# Patient Record
Sex: FEMALE | Race: WHITE | NOT HISPANIC OR LATINO | URBAN - METROPOLITAN AREA
[De-identification: names, ages, dates, MRNs, and addresses within clinical notes are randomized per-mention and may not be internally consistent; named-entity substitution may affect disease eponyms.]

---

## 2017-08-21 ENCOUNTER — OUTPATIENT (OUTPATIENT)
Dept: OUTPATIENT SERVICES | Facility: HOSPITAL | Age: 72
LOS: 1 days | Discharge: HOME | End: 2017-08-21

## 2017-08-21 DIAGNOSIS — I10 ESSENTIAL (PRIMARY) HYPERTENSION: ICD-10-CM

## 2017-08-21 DIAGNOSIS — J38.5 LARYNGEAL SPASM: ICD-10-CM

## 2017-08-21 DIAGNOSIS — R42 DIZZINESS AND GIDDINESS: ICD-10-CM

## 2017-09-11 ENCOUNTER — OUTPATIENT (OUTPATIENT)
Dept: OUTPATIENT SERVICES | Facility: HOSPITAL | Age: 72
LOS: 1 days | Discharge: HOME | End: 2017-09-11

## 2017-09-11 DIAGNOSIS — I10 ESSENTIAL (PRIMARY) HYPERTENSION: ICD-10-CM

## 2017-09-11 DIAGNOSIS — Z12.31 ENCOUNTER FOR SCREENING MAMMOGRAM FOR MALIGNANT NEOPLASM OF BREAST: ICD-10-CM

## 2017-09-11 DIAGNOSIS — R42 DIZZINESS AND GIDDINESS: ICD-10-CM

## 2017-09-13 DIAGNOSIS — M89.9 DISORDER OF BONE, UNSPECIFIED: ICD-10-CM

## 2017-09-13 DIAGNOSIS — Z78.0 ASYMPTOMATIC MENOPAUSAL STATE: ICD-10-CM

## 2017-09-13 DIAGNOSIS — Z13.820 ENCOUNTER FOR SCREENING FOR OSTEOPOROSIS: ICD-10-CM

## 2017-09-14 ENCOUNTER — OUTPATIENT (OUTPATIENT)
Dept: OUTPATIENT SERVICES | Facility: HOSPITAL | Age: 72
LOS: 1 days | Discharge: HOME | End: 2017-09-14

## 2017-09-14 DIAGNOSIS — R42 DIZZINESS AND GIDDINESS: ICD-10-CM

## 2017-09-14 DIAGNOSIS — R92.8 OTHER ABNORMAL AND INCONCLUSIVE FINDINGS ON DIAGNOSTIC IMAGING OF BREAST: ICD-10-CM

## 2017-09-14 DIAGNOSIS — I10 ESSENTIAL (PRIMARY) HYPERTENSION: ICD-10-CM

## 2017-12-13 ENCOUNTER — INPATIENT (INPATIENT)
Facility: HOSPITAL | Age: 72
LOS: 1 days | Discharge: HOME HEALTH | End: 2017-12-15
Attending: INTERNAL MEDICINE | Admitting: INTERNAL MEDICINE

## 2017-12-13 DIAGNOSIS — I10 ESSENTIAL (PRIMARY) HYPERTENSION: ICD-10-CM

## 2017-12-13 DIAGNOSIS — R42 DIZZINESS AND GIDDINESS: ICD-10-CM

## 2017-12-19 DIAGNOSIS — R42 DIZZINESS AND GIDDINESS: ICD-10-CM

## 2017-12-19 DIAGNOSIS — I10 ESSENTIAL (PRIMARY) HYPERTENSION: ICD-10-CM

## 2017-12-19 DIAGNOSIS — M19.90 UNSPECIFIED OSTEOARTHRITIS, UNSPECIFIED SITE: ICD-10-CM

## 2017-12-19 DIAGNOSIS — R11.0 NAUSEA: ICD-10-CM

## 2017-12-19 DIAGNOSIS — E83.42 HYPOMAGNESEMIA: ICD-10-CM

## 2017-12-19 DIAGNOSIS — F32.9 MAJOR DEPRESSIVE DISORDER, SINGLE EPISODE, UNSPECIFIED: ICD-10-CM

## 2017-12-19 DIAGNOSIS — R94.31 ABNORMAL ELECTROCARDIOGRAM [ECG] [EKG]: ICD-10-CM

## 2017-12-19 DIAGNOSIS — E87.1 HYPO-OSMOLALITY AND HYPONATREMIA: ICD-10-CM

## 2017-12-19 DIAGNOSIS — R27.0 ATAXIA, UNSPECIFIED: ICD-10-CM

## 2017-12-19 DIAGNOSIS — I42.1 OBSTRUCTIVE HYPERTROPHIC CARDIOMYOPATHY: ICD-10-CM

## 2018-03-16 ENCOUNTER — OUTPATIENT (OUTPATIENT)
Dept: OUTPATIENT SERVICES | Facility: HOSPITAL | Age: 73
LOS: 1 days | Discharge: HOME | End: 2018-03-16

## 2018-03-16 DIAGNOSIS — R92.8 OTHER ABNORMAL AND INCONCLUSIVE FINDINGS ON DIAGNOSTIC IMAGING OF BREAST: ICD-10-CM

## 2018-11-06 ENCOUNTER — OUTPATIENT (OUTPATIENT)
Dept: OUTPATIENT SERVICES | Facility: HOSPITAL | Age: 73
LOS: 1 days | Discharge: HOME | End: 2018-11-06

## 2018-11-06 DIAGNOSIS — R92.8 OTHER ABNORMAL AND INCONCLUSIVE FINDINGS ON DIAGNOSTIC IMAGING OF BREAST: ICD-10-CM

## 2019-04-22 PROBLEM — Z00.00 ENCOUNTER FOR PREVENTIVE HEALTH EXAMINATION: Status: ACTIVE | Noted: 2019-04-22

## 2019-05-22 ENCOUNTER — APPOINTMENT (OUTPATIENT)
Dept: CARDIOLOGY | Facility: CLINIC | Age: 74
End: 2019-05-22
Payer: MEDICARE

## 2019-05-22 PROCEDURE — 93000 ELECTROCARDIOGRAM COMPLETE: CPT

## 2019-05-22 PROCEDURE — 99214 OFFICE O/P EST MOD 30 MIN: CPT

## 2019-08-20 ENCOUNTER — OUTPATIENT (OUTPATIENT)
Dept: OUTPATIENT SERVICES | Facility: HOSPITAL | Age: 74
LOS: 1 days | Discharge: HOME | End: 2019-08-20
Payer: MEDICARE

## 2019-08-20 DIAGNOSIS — R92.8 OTHER ABNORMAL AND INCONCLUSIVE FINDINGS ON DIAGNOSTIC IMAGING OF BREAST: ICD-10-CM

## 2019-08-20 PROCEDURE — 76642 ULTRASOUND BREAST LIMITED: CPT | Mod: 26,RT

## 2019-08-20 PROCEDURE — 77065 DX MAMMO INCL CAD UNI: CPT | Mod: 26,RT

## 2019-08-20 PROCEDURE — G0279: CPT | Mod: 26,RT

## 2019-08-23 ENCOUNTER — RESULT REVIEW (OUTPATIENT)
Age: 74
End: 2019-08-23

## 2019-08-23 ENCOUNTER — OUTPATIENT (OUTPATIENT)
Dept: OUTPATIENT SERVICES | Facility: HOSPITAL | Age: 74
LOS: 1 days | Discharge: HOME | End: 2019-08-23
Payer: MEDICARE

## 2019-08-23 PROCEDURE — 19083 BX BREAST 1ST LESION US IMAG: CPT | Mod: RT

## 2019-08-23 PROCEDURE — 88305 TISSUE EXAM BY PATHOLOGIST: CPT | Mod: 26

## 2019-08-23 PROCEDURE — 88342 IMHCHEM/IMCYTCHM 1ST ANTB: CPT | Mod: 26

## 2019-08-23 PROCEDURE — 88341 IMHCHEM/IMCYTCHM EA ADD ANTB: CPT | Mod: 26

## 2019-08-23 PROCEDURE — 77065 DX MAMMO INCL CAD UNI: CPT | Mod: 26,RT

## 2019-08-26 LAB — SURGICAL PATHOLOGY STUDY: SIGNIFICANT CHANGE UP

## 2019-08-28 DIAGNOSIS — Z17.0 ESTROGEN RECEPTOR POSITIVE STATUS [ER+]: ICD-10-CM

## 2019-08-28 DIAGNOSIS — C50.911 MALIGNANT NEOPLASM OF UNSPECIFIED SITE OF RIGHT FEMALE BREAST: ICD-10-CM

## 2019-08-28 DIAGNOSIS — N63.10 UNSPECIFIED LUMP IN THE RIGHT BREAST, UNSPECIFIED QUADRANT: ICD-10-CM

## 2019-09-04 ENCOUNTER — APPOINTMENT (OUTPATIENT)
Dept: BREAST CENTER | Facility: CLINIC | Age: 74
End: 2019-09-04
Payer: MEDICARE

## 2019-09-04 VITALS
WEIGHT: 120 LBS | HEIGHT: 62 IN | SYSTOLIC BLOOD PRESSURE: 146 MMHG | BODY MASS INDEX: 22.08 KG/M2 | DIASTOLIC BLOOD PRESSURE: 88 MMHG | TEMPERATURE: 98.4 F

## 2019-09-04 DIAGNOSIS — Z86.79 PERSONAL HISTORY OF OTHER DISEASES OF THE CIRCULATORY SYSTEM: ICD-10-CM

## 2019-09-04 DIAGNOSIS — Z86.39 PERSONAL HISTORY OF OTHER ENDOCRINE, NUTRITIONAL AND METABOLIC DISEASE: ICD-10-CM

## 2019-09-04 PROCEDURE — 99204 OFFICE O/P NEW MOD 45 MIN: CPT

## 2019-09-05 PROBLEM — Z86.79 HISTORY OF HYPERTENSION: Status: RESOLVED | Noted: 2019-09-05 | Resolved: 2019-09-05

## 2019-09-05 PROBLEM — Z86.39 HISTORY OF HYPERLIPIDEMIA: Status: RESOLVED | Noted: 2019-09-05 | Resolved: 2019-09-05

## 2019-09-05 PROBLEM — Z86.79 HISTORY OF HYPERTROPHIC CARDIOMYOPATHY: Status: RESOLVED | Noted: 2019-09-05 | Resolved: 2019-09-05

## 2019-09-05 RX ORDER — METOPROLOL TARTRATE 50 MG/1
50 TABLET, FILM COATED ORAL
Refills: 0 | Status: ACTIVE | COMMUNITY

## 2019-09-05 RX ORDER — LOSARTAN POTASSIUM 100 MG/1
TABLET, FILM COATED ORAL
Refills: 0 | Status: ACTIVE | COMMUNITY

## 2019-09-05 RX ORDER — EZETIMIBE 10 MG/1
TABLET ORAL
Refills: 0 | Status: ACTIVE | COMMUNITY

## 2019-09-05 NOTE — ASSESSMENT
[FreeTextEntry1] : Clary is a 74 postmenopausal F with a screen detected right breast cancer, [id number], ER/VA pos, Her 2 neg, stage 1, AJCC 8th edition. \par \par There was no suspicious abnormalities palpated in either breast on exam today.  Her recently biopsied right breast asymmetry was not readily palpable.  She did not have any lymphadenopathy on exam today.  I would like to repeat b/l axillary US to rule out any abnormal appearing lymph nodes. \par \par We have discussed her new diagnosis of breast cancer.  She is currently a stage 1 breast cancer, based off AJCC 8th edition.  We discussed her surgical and other treatment options at this time. \par \par In regards to her right sided breast cancer, she is a great candidate for either breast conservation surgery or a mastectomy.  This lesion is located about 5-6 cm from the nipple based off the US so a nipple sparing mastectomy could be attempted, as long as the intraop frozen section shows that the retroareolar margin is negative for invasive disease.  There is no difference in survival between a lumpectomy + radiation therapy and a mastectomy.  However the rate of local recurrence is slightly higher with the lumpectomy. The rate of recurrence with a mastectomy is not zero, however, and is likely closer to 4-9%.  \par \par Regardless of if she chooses a mastectomy or a lumpectomy, we would normally perform an evaluation of her axillary lymph nodes via a sentinel lymph node biopsy.  Per [facility] choosing wisely guidelines, for patients >69 y/o with small breast cancers that are ER/VA positive and Her 2 negative, I have given her the option for omitting the SLN Bx as it would likely not change her further management, would add morbidity to her surgery and would likely not change her overall survival.  Especially given her history of hypertrophic cardiomyopathy, she also will not tolerate general anesthesia well.  This was discussed with Clary and two of her children who agree with the omission of the SLN Bx. \par \par She would like to pursue breast conservation surgery.  Her right breast lesion is not palpable on clinical exam, so she will need a preoperative RF localization procedure of her right breast mass.  In the interim, we will obtain a bilateral breast MRI to further evaluate the extent of her disease. \par \par We did briefly discuss the different radiation options.  If she got a mastectomy, she would likely only need radiation therapy if she had a lot of positive margins or if her axillary lymph nodes were positive for metastatic disease.  If she undergoes a lumpectomy, she is a candidate for both partial breast irradiation and whole breast irradiation.  We briefly discussed the differences between these two modalities.\par \par In regards to systemic therapy, we briefly discussed both chemotherapy and endocrine therapy.  If the final pathology reveals that the tumor is larger than 1 cm and her 2 negative, we would likely need to send an additional study on her tumor sample, called an oncotype DX to make the determination regarding if chemotherapy would help her.  At the completion of all these treatments, she should get endocrine therapy, an AI, for a total of at least 5 years.  \par \par In regards to reconstruction, if she decides to proceed with a mastectomy,  she has the option for undergoing immediate breast reconstruction. She is not interested in undergoing a mastectomy at this time. \par \par She will need a breast MRI to determine extent of disease.  This will be scheduled for her. \par \par In addition, per [facility] consensus guideline, any patient with breast cancer qualifies for genetic testing.  [facility] panel genetic testing was performed today. \par \par All of her questions were answered.  She knows to call with any further questions or concerns. I will see her after she gets her MRI breast. \par \par PLAN\par -breast MRI \par -[facility] genetic testing \par -OR: RIGHT WIDE LOCAL EXCISION WITH RF LOCALIZATION \par -DIAGNOSIS: RIGHT BREAST CANCER \par \par PLAN: \par -breast MRI, B/L AXILLARY us \par -genetic testing -- [facility], blood drawn today \par -OR: R WLE WITH RF LOCALIZATION (WILL OMIT THE SLN BX)\par -DIAGNOSIS: RIGHT BREAST CANCER

## 2019-09-05 NOTE — HISTORY OF PRESENT ILLNESS
[FreeTextEntry1] : Clary is a 74 F with a screen detected right breast cancer, [id number], ER/LA pos, her 2 neg, AJCC 8th edition. \par \par She has no breast related complaints at this time.  She denies any breast pain, has not palpated any new palpable masses in either breast and denies any nipple discharge or retraction.  \par \par Her work up was as follows: \par 19 -- R dx mammogram and US (short term 6 month f/up for BIRADS 3 calcifications)\par -scattered areas of fibroglandular density\par -R: calcifications show no change in number, distribution, or morphology --> deemed BIRADS 3\par -developing asymmetry in medial R breast \par R US \par -@1N5-6, vague hypoechoic areas measuring 4 x 3 x 4 mm --> rec US CNBx \par BIRADS 4\par \par 19 -- R US CNBx \par -IDC, well differentiated, tubular carcinoma\par -ER/LA pos, her 2 negative \par -Ki67 3-5%\par \par Of note, she has been on estrogen for the past 20 years and has not been able to wean herself off of it yet. \par \par HISTORICAL RISK FACTORS: \par -history of breast aspiration in the past, but no prior breast biopsies or surgeries \par -no family history of breast or ovarian cancer \par -, age at first live birth was 22 \par -HRT use for 20 years \par -no prior OCP use

## 2019-09-05 NOTE — DATA REVIEWED
[FreeTextEntry1] : EXAM: US BREAST LIMITED RT \par EXAM: MG MAMMO DIAG W DORETHA RT# \par \par \par PROCEDURE DATE: 08/20/2019 \par \par \par \par INTERPRETATION: Clinical History / Reason for exam: Follow-up for right \par breast calcifications since September 2017. \par \par The patient reports her last clinical breast examination was performed over \par one year prior. \par \par This is a follow-up examination to reassess calcifications considered \par probably benign on the previous mammogram. Magnification images as well as \par routine mammographic projections were obtained. 3D tomosynthesis images were \par obtained as well. \par \par Computer-aided detection was utilized in the interpretation of this \par examination. \par \par Comparison is made to several prior exams dating back to 10/14/2014 \par \par \par Breast composition:There are scattered areas of fibroglandular density. \par \par The calcifications, situated in the right breast, show no change in number, \par distribution or morphology. Additional follow-up in 6 months is recommended \par \par Incidental note is made of a developing focal asymmetry in the medial aspect \par of the right breast. Therefore, targeted ultrasound was performed. \par \par Targeted right breast ultrasound \par \par At 1:00 5 to 6 cm from the nipple, there is a vague hypoechoic area \par measuring 4 x 3 x 4 mm. This may correspond to the mammographic finding. \par \par Impression: The stability of the calcifications in the right breast supports \par a benign etiology. Follow-up mammogram in 6 months is recommended \par \par Developing focal asymmetry in the medial aspect of the right breast is \par suspicious. \par \par Recommendation: Ultrasound-guided biopsy of the right breast. \par \par BI-RADS Category 4: Suspicious \par \par \par \par The findings and recommendations were discussed with the patient and all \par questions were answered. \par \par The findings and recommendations were discussed with Dr. Celis on 8/20/2019 \par at 1:30 PM. \par \par \par \par \par \par \par \par CAROLINE BUSTOS M.D., ATTENDING RADIOLOGIST \par This document has been electronically signed. Aug 20 2019 1:30PM \par \par EXAM: US BX BRST 1ST RT [facility] \par \par *** ADDENDUM 09/03/2019 *** \par \par Histology: \par \par - INVASIVE WELL DIFFERENTIATED DUCTAL CARCINOMA (TUBULAR \par CARCINOMA). \par \par Findings are malignant concordant. \par \par Recommendations: Surgical and oncologic follow up is recommended. \par \par The above findings and recommendations were discussed with the patient on \par 8/29/2019. \par \par The above findings and recommendations were discussed with Dr. Celis on \par 8/29/2019. \par \par \par *** END OF ADDENDUM 09/03/2019 *** \par \par \par \par PROCEDURE DATE: 08/23/2019 \par \par \par \par INTERPRETATION: Clinical History / Reason for exam: Indeterminate right \par breast mass. \par \par Images were reviewed. Informed consent was obtained including a discussion \par of risks and benefits of the procedure as well as questioning about patient \par allergies. The patient safety checklist, including time out procedure, was \par used. \par \par The mass in the 1 o clock position 5 to 6 cm from the nipple was identified. \par Under sterile conditions and after local infiltration with 5 mL buffered 1% \par lidocaine a 14g spring loaded core biopsy needle was inserted. Under \par ultrasound guidance the needle was positioned adjacent to the mass and 5 \par core samples were obtained. The needle was removed. A marking clip (CoSchedule \par top-hat) was deployed under ultrasound guidance. Manual compression was \par applied to achieve hemostasis. \par \par The patient tolerated the procedure well and there was no immediate post \par procedure complication. The specimens were sent to the laboratory for \par analysis. \par \par A post procedure mammogram was obtained and demonstrated the clip to be in \par good position. \par \par IMPRESSION: \par \par Successful ultrasound guided core biopsy of the right breast. \par \par Histology: Pending, to be reported in an addendum. \par \par ***Please see the addendum at the top of this report. It may contain \par additional important information or changes.**** \par \par \par \par \par HORTENSIA BURNS M.D., ATTENDING RADIOLOGIST \par This document has been electronically signed. Aug 23 2019 1:53PM \par Addend: HORTENSIA BURNS M.D., ATTENDING RADIOLOGIST \par This addendum was electronically signed on: Sep 3 2019 2:24PM. \par \par EXAM: MG MAMMO DIAG BI# \par \par \par PROCEDURE DATE: 11/06/2018 \par \par \par \par INTERPRETATION: Clinical History / Reason for exam: Follow-up for right \par breast calcifications since September 2017. \par \par The patient reports her last clinical breast examination was performed 7 \par months ago. \par \par This is a follow-up examination to reassess calcifications considered \par probably benign on the previous mammogram. Magnification images as well as \par routine mammographic projections were obtained. \par \par Computer-aided detection was utilized in the interpretation of this \par examination. \par \par Comparison is made to the prior mammograms dated March 16, 2018, September \par 14, 2017 and September 11, 2017. \par \par Breast composition:There are scattered areas of fibroglandular density. \par \par No new or suspicious masses or areas of architectural distortion are seen in \par either breast. The calcifications, situated in the right breast, show no \par change in number, distribution or morphology. \par \par Impression: The stability of the calcifications in the right breast supports \par a benign etiology. \par \par Recommendation: Follow-up unilateral diagnostic mammogram in 6 months. \par \par BI-RADS category 3: Probably Benign \par \par \par \par \par \par \par \par HARJINDER MEJIA M.D., ATTENDING RADIOLOGIST \par This document has been electronically signed. Nov 6 2018 3:30PM \par \par

## 2019-09-05 NOTE — REVIEW OF SYSTEMS
[Negative] : Heme/Lymph [Fever] : no fever [Chills] : no chills [Abn Vaginal Bleeding] : no unexplained vaginal bleeding [As Noted in HPI] : as noted in HPI [Skin Lesions] : no skin lesions [Skin Wound] : no skin wound [Breast Pain] : no breast pain [Breast Lump] : no breast lump [Hot Flashes] : no hot flashes [FreeTextEntry5] : has a history of cardiomyopathy, able to walk but does get winded walking up hills or walking for a long period of time

## 2019-09-05 NOTE — PAST MEDICAL HISTORY
[Menopause Age____] : age at menopause was [unfilled] [Menarche Age ____] : age at menarche was [unfilled] [History of Hormone Replacement Treatment] : has a history of hormone replacement treatment [Total Preg ___] : G[unfilled] [Live Births ___] : P[unfilled]  [Age At Live Birth ___] : Age at live birth: [unfilled] [FreeTextEntry5] : denies  [FreeTextEntry6] : denies [FreeTextEntry7] : denies  [FreeTextEntry8] : denies

## 2019-09-05 NOTE — PHYSICAL EXAM
[Normocephalic] : normocephalic [Atraumatic] : atraumatic [EOMI] : extra ocular movement intact [No Supraclavicular Adenopathy] : no supraclavicular adenopathy [Clear to Auscultation Bilat] : clear to auscultation bilaterally [No Cervical Adenopathy] : no cervical adenopathy [No dominant masses] : no dominant masses in right breast  [No dominant masses] : no dominant masses left breast [No Nipple Retraction] : no right nipple retraction [No Nipple Discharge] : no left nipple discharge [Not Tender] : non-tender [Soft] : abdomen soft [No Edema] : no edema [No Rashes] : no rashes [No Ulceration] : no ulceration [Examined in the supine and seated position] : examined in the supine and seated position [No Axillary Lymphadenopathy] : no right axillary lymphadenopathy [de-identified] : no suspicious mass palpated in either breast  [de-identified] : no suspicious masses palpated in area of her recent biopsy  [de-identified] : no suspicious lymphadenopathy

## 2019-09-06 ENCOUNTER — APPOINTMENT (OUTPATIENT)
Dept: CARDIOLOGY | Facility: CLINIC | Age: 74
End: 2019-09-06
Payer: MEDICARE

## 2019-09-06 PROCEDURE — 93000 ELECTROCARDIOGRAM COMPLETE: CPT

## 2019-09-06 PROCEDURE — 99213 OFFICE O/P EST LOW 20 MIN: CPT

## 2019-09-09 ENCOUNTER — FORM ENCOUNTER (OUTPATIENT)
Age: 74
End: 2019-09-09

## 2019-09-10 ENCOUNTER — OUTPATIENT (OUTPATIENT)
Dept: OUTPATIENT SERVICES | Facility: HOSPITAL | Age: 74
LOS: 1 days | Discharge: HOME | End: 2019-09-10
Payer: MEDICARE

## 2019-09-10 DIAGNOSIS — C50.111 MALIGNANT NEOPLASM OF CENTRAL PORTION OF RIGHT FEMALE BREAST: ICD-10-CM

## 2019-09-10 PROCEDURE — 77049 MRI BREAST C-+ W/CAD BI: CPT | Mod: 26

## 2019-09-11 DIAGNOSIS — R92.8 OTHER ABNORMAL AND INCONCLUSIVE FINDINGS ON DIAGNOSTIC IMAGING OF BREAST: ICD-10-CM

## 2019-09-12 PROBLEM — R92.8 ABNORMAL FINDING ON BREAST IMAGING: Status: ACTIVE | Noted: 2019-09-12

## 2019-09-18 ENCOUNTER — FORM ENCOUNTER (OUTPATIENT)
Age: 74
End: 2019-09-18

## 2019-09-19 ENCOUNTER — OUTPATIENT (OUTPATIENT)
Dept: OUTPATIENT SERVICES | Facility: HOSPITAL | Age: 74
LOS: 1 days | Discharge: HOME | End: 2019-09-19
Payer: MEDICARE

## 2019-09-19 ENCOUNTER — FORM ENCOUNTER (OUTPATIENT)
Age: 74
End: 2019-09-19

## 2019-09-19 DIAGNOSIS — Z85.3 PERSONAL HISTORY OF MALIGNANT NEOPLASM OF BREAST: ICD-10-CM

## 2019-09-19 DIAGNOSIS — R92.8 OTHER ABNORMAL AND INCONCLUSIVE FINDINGS ON DIAGNOSTIC IMAGING OF BREAST: ICD-10-CM

## 2019-09-19 PROCEDURE — 76642 ULTRASOUND BREAST LIMITED: CPT | Mod: 26,50

## 2019-09-20 ENCOUNTER — RESULT REVIEW (OUTPATIENT)
Age: 74
End: 2019-09-20

## 2019-09-20 ENCOUNTER — OUTPATIENT (OUTPATIENT)
Dept: OUTPATIENT SERVICES | Facility: HOSPITAL | Age: 74
LOS: 1 days | Discharge: HOME | End: 2019-09-20
Payer: MEDICARE

## 2019-09-20 DIAGNOSIS — R92.8 OTHER ABNORMAL AND INCONCLUSIVE FINDINGS ON DIAGNOSTIC IMAGING OF BREAST: ICD-10-CM

## 2019-09-20 PROCEDURE — 88305 TISSUE EXAM BY PATHOLOGIST: CPT | Mod: 26

## 2019-09-20 PROCEDURE — 77065 DX MAMMO INCL CAD UNI: CPT | Mod: 26,RT

## 2019-09-20 PROCEDURE — 19085 BX BREAST 1ST LESION MR IMAG: CPT | Mod: RT

## 2019-09-22 ENCOUNTER — FORM ENCOUNTER (OUTPATIENT)
Age: 74
End: 2019-09-22

## 2019-09-23 ENCOUNTER — OUTPATIENT (OUTPATIENT)
Dept: OUTPATIENT SERVICES | Facility: HOSPITAL | Age: 74
LOS: 1 days | Discharge: HOME | End: 2019-09-23
Payer: MEDICARE

## 2019-09-23 ENCOUNTER — FORM ENCOUNTER (OUTPATIENT)
Age: 74
End: 2019-09-23

## 2019-09-23 VITALS
RESPIRATION RATE: 18 BRPM | DIASTOLIC BLOOD PRESSURE: 68 MMHG | WEIGHT: 114.64 LBS | OXYGEN SATURATION: 100 % | HEART RATE: 70 BPM | TEMPERATURE: 99 F | SYSTOLIC BLOOD PRESSURE: 128 MMHG | HEIGHT: 62 IN

## 2019-09-23 DIAGNOSIS — Z98.890 OTHER SPECIFIED POSTPROCEDURAL STATES: Chronic | ICD-10-CM

## 2019-09-23 DIAGNOSIS — Z01.818 ENCOUNTER FOR OTHER PREPROCEDURAL EXAMINATION: ICD-10-CM

## 2019-09-23 DIAGNOSIS — R92.8 OTHER ABNORMAL AND INCONCLUSIVE FINDINGS ON DIAGNOSTIC IMAGING OF BREAST: ICD-10-CM

## 2019-09-23 LAB
ALBUMIN SERPL ELPH-MCNC: 4.1 G/DL — SIGNIFICANT CHANGE UP (ref 3.5–5.2)
ALP SERPL-CCNC: 156 U/L — HIGH (ref 30–115)
ALT FLD-CCNC: 26 U/L — SIGNIFICANT CHANGE UP (ref 0–41)
ANION GAP SERPL CALC-SCNC: 13 MMOL/L — SIGNIFICANT CHANGE UP (ref 7–14)
APTT BLD: 30.2 SEC — SIGNIFICANT CHANGE UP (ref 27–39.2)
AST SERPL-CCNC: 17 U/L — SIGNIFICANT CHANGE UP (ref 0–41)
BASOPHILS # BLD AUTO: 0.03 K/UL — SIGNIFICANT CHANGE UP (ref 0–0.2)
BASOPHILS NFR BLD AUTO: 0.3 % — SIGNIFICANT CHANGE UP (ref 0–1)
BILIRUB SERPL-MCNC: 0.5 MG/DL — SIGNIFICANT CHANGE UP (ref 0.2–1.2)
BUN SERPL-MCNC: 18 MG/DL — SIGNIFICANT CHANGE UP (ref 10–20)
CALCIUM SERPL-MCNC: 10.2 MG/DL — HIGH (ref 8.5–10.1)
CHLORIDE SERPL-SCNC: 97 MMOL/L — LOW (ref 98–110)
CO2 SERPL-SCNC: 25 MMOL/L — SIGNIFICANT CHANGE UP (ref 17–32)
CREAT SERPL-MCNC: 0.7 MG/DL — SIGNIFICANT CHANGE UP (ref 0.7–1.5)
EOSINOPHIL # BLD AUTO: 0.08 K/UL — SIGNIFICANT CHANGE UP (ref 0–0.7)
EOSINOPHIL NFR BLD AUTO: 0.8 % — SIGNIFICANT CHANGE UP (ref 0–8)
ESTIMATED AVERAGE GLUCOSE: 180 MG/DL — HIGH (ref 68–114)
GLUCOSE SERPL-MCNC: 251 MG/DL — HIGH (ref 70–99)
HBA1C BLD-MCNC: 7.9 % — HIGH (ref 4–5.6)
HCT VFR BLD CALC: 37.9 % — SIGNIFICANT CHANGE UP (ref 37–47)
HGB BLD-MCNC: 12.4 G/DL — SIGNIFICANT CHANGE UP (ref 12–16)
IMM GRANULOCYTES NFR BLD AUTO: 0.5 % — HIGH (ref 0.1–0.3)
INR BLD: 1.01 RATIO — SIGNIFICANT CHANGE UP (ref 0.65–1.3)
LYMPHOCYTES # BLD AUTO: 1.04 K/UL — LOW (ref 1.2–3.4)
LYMPHOCYTES # BLD AUTO: 10 % — LOW (ref 20.5–51.1)
MCHC RBC-ENTMCNC: 30.5 PG — SIGNIFICANT CHANGE UP (ref 27–31)
MCHC RBC-ENTMCNC: 32.7 G/DL — SIGNIFICANT CHANGE UP (ref 32–37)
MCV RBC AUTO: 93.1 FL — SIGNIFICANT CHANGE UP (ref 81–99)
MONOCYTES # BLD AUTO: 0.63 K/UL — HIGH (ref 0.1–0.6)
MONOCYTES NFR BLD AUTO: 6.1 % — SIGNIFICANT CHANGE UP (ref 1.7–9.3)
NEUTROPHILS # BLD AUTO: 8.52 K/UL — HIGH (ref 1.4–6.5)
NEUTROPHILS NFR BLD AUTO: 82.3 % — HIGH (ref 42.2–75.2)
NRBC # BLD: 0 /100 WBCS — SIGNIFICANT CHANGE UP (ref 0–0)
PLATELET # BLD AUTO: 332 K/UL — SIGNIFICANT CHANGE UP (ref 130–400)
POTASSIUM SERPL-MCNC: 5.8 MMOL/L — HIGH (ref 3.5–5)
POTASSIUM SERPL-SCNC: 5.8 MMOL/L — HIGH (ref 3.5–5)
PROT SERPL-MCNC: 6.9 G/DL — SIGNIFICANT CHANGE UP (ref 6–8)
PROTHROM AB SERPL-ACNC: 11.6 SEC — SIGNIFICANT CHANGE UP (ref 9.95–12.87)
RBC # BLD: 4.07 M/UL — LOW (ref 4.2–5.4)
RBC # FLD: 13 % — SIGNIFICANT CHANGE UP (ref 11.5–14.5)
SODIUM SERPL-SCNC: 135 MMOL/L — SIGNIFICANT CHANGE UP (ref 135–146)
SURGICAL PATHOLOGY STUDY: SIGNIFICANT CHANGE UP
WBC # BLD: 10.35 K/UL — SIGNIFICANT CHANGE UP (ref 4.8–10.8)
WBC # FLD AUTO: 10.35 K/UL — SIGNIFICANT CHANGE UP (ref 4.8–10.8)

## 2019-09-23 PROCEDURE — 71046 X-RAY EXAM CHEST 2 VIEWS: CPT | Mod: 26

## 2019-09-23 PROCEDURE — 93010 ELECTROCARDIOGRAM REPORT: CPT

## 2019-09-23 RX ORDER — METFORMIN HYDROCHLORIDE 850 MG/1
1 TABLET ORAL
Qty: 0 | Refills: 0 | DISCHARGE

## 2019-09-23 RX ORDER — LOSARTAN POTASSIUM 100 MG/1
1 TABLET, FILM COATED ORAL
Qty: 0 | Refills: 0 | DISCHARGE

## 2019-09-23 RX ORDER — METOPROLOL TARTRATE 50 MG
1 TABLET ORAL
Qty: 0 | Refills: 0 | DISCHARGE

## 2019-09-23 RX ORDER — FLUOXETINE HCL 10 MG
1 CAPSULE ORAL
Qty: 0 | Refills: 0 | DISCHARGE

## 2019-09-23 RX ORDER — EZETIMIBE 10 MG/1
1 TABLET ORAL
Qty: 0 | Refills: 0 | DISCHARGE

## 2019-09-23 NOTE — H&P PST ADULT - NSICDXPASTMEDICALHX_GEN_ALL_CORE_FT
PAST MEDICAL HISTORY:  Breast cancer, female right    Hyperlipidemia     Hypertension     Type 2 diabetes mellitus PAST MEDICAL HISTORY:  Anxiety     Breast cancer, female right    Hyperlipidemia     Hypertension     Type 2 diabetes mellitus

## 2019-09-23 NOTE — H&P PST ADULT - NSANTHOSAYNRD_GEN_A_CORE
No. WINTER screening performed.  STOP BANG Legend: 0-2 = LOW Risk; 3-4 = INTERMEDIATE Risk; 5-8 = HIGH Risk

## 2019-09-23 NOTE — H&P PST ADULT - HISTORY OF PRESENT ILLNESS
74 year old female here for above 74 year old female here for above, pt has had calcifications in breast for several years, same was being monitored twice per year, recently change in breast, had biopsy, + breast cancer, pt needs proceure  pt denies cp, sob, collado or palpitations  pt denies urinary frequency, urgency or burning  ex clare 1-2 fos

## 2019-09-24 ENCOUNTER — OUTPATIENT (OUTPATIENT)
Dept: OUTPATIENT SERVICES | Facility: HOSPITAL | Age: 74
LOS: 1 days | Discharge: HOME | End: 2019-09-24
Payer: MEDICARE

## 2019-09-24 DIAGNOSIS — Z98.890 OTHER SPECIFIED POSTPROCEDURAL STATES: Chronic | ICD-10-CM

## 2019-09-24 DIAGNOSIS — R92.8 OTHER ABNORMAL AND INCONCLUSIVE FINDINGS ON DIAGNOSTIC IMAGING OF BREAST: ICD-10-CM

## 2019-09-24 PROBLEM — C50.919 MALIGNANT NEOPLASM OF UNSPECIFIED SITE OF UNSPECIFIED FEMALE BREAST: Chronic | Status: ACTIVE | Noted: 2019-09-23

## 2019-09-24 PROBLEM — F41.9 ANXIETY DISORDER, UNSPECIFIED: Chronic | Status: ACTIVE | Noted: 2019-09-23

## 2019-09-24 PROBLEM — I10 ESSENTIAL (PRIMARY) HYPERTENSION: Chronic | Status: ACTIVE | Noted: 2019-09-23

## 2019-09-24 PROBLEM — E78.5 HYPERLIPIDEMIA, UNSPECIFIED: Chronic | Status: ACTIVE | Noted: 2019-09-23

## 2019-09-24 PROBLEM — E11.9 TYPE 2 DIABETES MELLITUS WITHOUT COMPLICATIONS: Chronic | Status: ACTIVE | Noted: 2019-09-23

## 2019-09-24 PROCEDURE — 74183 MRI ABD W/O CNTR FLWD CNTR: CPT | Mod: 26

## 2019-10-01 ENCOUNTER — APPOINTMENT (OUTPATIENT)
Dept: HEMATOLOGY ONCOLOGY | Facility: CLINIC | Age: 74
End: 2019-10-01
Payer: MEDICARE

## 2019-10-01 ENCOUNTER — OUTPATIENT (OUTPATIENT)
Dept: OUTPATIENT SERVICES | Facility: HOSPITAL | Age: 74
LOS: 1 days | Discharge: HOME | End: 2019-10-01

## 2019-10-01 ENCOUNTER — LABORATORY RESULT (OUTPATIENT)
Age: 74
End: 2019-10-01

## 2019-10-01 VITALS
WEIGHT: 114 LBS | BODY MASS INDEX: 20.98 KG/M2 | TEMPERATURE: 98.4 F | HEIGHT: 62 IN | DIASTOLIC BLOOD PRESSURE: 82 MMHG | SYSTOLIC BLOOD PRESSURE: 132 MMHG | HEART RATE: 72 BPM

## 2019-10-01 DIAGNOSIS — E78.5 HYPERLIPIDEMIA, UNSPECIFIED: ICD-10-CM

## 2019-10-01 DIAGNOSIS — I10 ESSENTIAL (PRIMARY) HYPERTENSION: ICD-10-CM

## 2019-10-01 DIAGNOSIS — Z80.1 FAMILY HISTORY OF MALIGNANT NEOPLASM OF TRACHEA, BRONCHUS AND LUNG: ICD-10-CM

## 2019-10-01 DIAGNOSIS — K86.89 OTHER SPECIFIED DISEASES OF PANCREAS: ICD-10-CM

## 2019-10-01 DIAGNOSIS — Z98.890 OTHER SPECIFIED POSTPROCEDURAL STATES: Chronic | ICD-10-CM

## 2019-10-01 DIAGNOSIS — R16.0 HEPATOMEGALY, NOT ELSEWHERE CLASSIFIED: ICD-10-CM

## 2019-10-01 DIAGNOSIS — E11.9 TYPE 2 DIABETES MELLITUS W/OUT COMPLICATIONS: ICD-10-CM

## 2019-10-01 DIAGNOSIS — Z17.0 MALIGNANT NEOPLASM OF CENTRAL PORTION OF RIGHT FEMALE BREAST: ICD-10-CM

## 2019-10-01 DIAGNOSIS — F32.9 MAJOR DEPRESSIVE DISORDER, SINGLE EPISODE, UNSPECIFIED: ICD-10-CM

## 2019-10-01 DIAGNOSIS — C50.111 MALIGNANT NEOPLASM OF CENTRAL PORTION OF RIGHT FEMALE BREAST: ICD-10-CM

## 2019-10-01 DIAGNOSIS — I42.1 OBSTRUCTIVE HYPERTROPHIC CARDIOMYOPATHY: ICD-10-CM

## 2019-10-01 LAB
HCT VFR BLD CALC: 34.8 %
HGB BLD-MCNC: 11.8 G/DL
MCHC RBC-ENTMCNC: 30.7 PG
MCHC RBC-ENTMCNC: 33.9 G/DL
MCV RBC AUTO: 90.6 FL
PLATELET # BLD AUTO: 323 K/UL
PMV BLD: 8.7 FL
RBC # BLD: 3.84 M/UL
RBC # FLD: 13.2 %
WBC # FLD AUTO: 9.84 K/UL

## 2019-10-01 PROCEDURE — 99205 OFFICE O/P NEW HI 60 MIN: CPT

## 2019-10-01 RX ORDER — DULOXETINE HYDROCHLORIDE 30 MG/1
CAPSULE, DELAYED RELEASE ORAL
Refills: 0 | Status: DISCONTINUED | COMMUNITY
End: 2019-10-01

## 2019-10-01 RX ORDER — COLESTIPOL HYDROCHLORIDE 1 G/1
TABLET, FILM COATED ORAL
Refills: 0 | Status: DISCONTINUED | COMMUNITY
End: 2019-10-01

## 2019-10-01 NOTE — PHYSICAL EXAM
[Fully active, able to carry on all pre-disease performance without restriction] : Status 0 - Fully active, able to carry on all pre-disease performance without restriction [Normal] : no peripheral adenopathy appreciated

## 2019-10-02 LAB
AFP-TM SERPL-MCNC: 5.5 NG/ML
ALBUMIN SERPL ELPH-MCNC: 4.3 G/DL
ALP BLD-CCNC: 622 U/L
ALT SERPL-CCNC: 155 U/L
ANION GAP SERPL CALC-SCNC: 17 MMOL/L
AST SERPL-CCNC: 79 U/L
BILIRUB DIRECT SERPL-MCNC: 0.3 MG/DL
BILIRUB INDIRECT SERPL-MCNC: 0.5 MG/DL
BILIRUB SERPL-MCNC: 0.8 MG/DL
BUN SERPL-MCNC: 13 MG/DL
CALCIUM SERPL-MCNC: 9.8 MG/DL
CANCER AG125 SERPL-ACNC: 56 U/ML
CANCER AG15-3 SERPL-ACNC: 15.1 U/ML
CANCER AG19-9 SERPL-ACNC: 4372 U/ML
CANCER AG27-29 SERPL-ACNC: 17 U/ML
CEA SERPL-MCNC: 78.5 NG/ML
CHLORIDE SERPL-SCNC: 95 MMOL/L
CO2 SERPL-SCNC: 20 MMOL/L
CREAT SERPL-MCNC: 0.7 MG/DL
GLUCOSE SERPL-MCNC: 211 MG/DL
POTASSIUM SERPL-SCNC: 4.1 MMOL/L
PROT SERPL-MCNC: 7.2 G/DL
SODIUM SERPL-SCNC: 132 MMOL/L

## 2019-10-02 NOTE — HISTORY OF PRESENT ILLNESS
[de-identified] : 73 y/o F with h/o HTN, DLD, mild HOCM, recent Dx of DM was referred by breast surgeon Dr Curiel for recently diagnosed multiple suspicious hepatic and pancreatic masses while undergoing w/u for the recently diagnosed right breast cancer. \par \par Pt has had right breast calcifications for the last 2 years. She underwent another diagnostic mammogram on 8/20/19 which showed that the calcifications, situated in the right breast, show no change in number, distribution or morphology. However, a developing focal asymmetry in the medial aspect of the right breast was seen. Therefore, targeted ultrasound was performed which showed a vague hypoechoic area measuring 4 x 3 x 4 mm at 1:00 position,  5 to 6 cm from the nipple. She underwent US guided biopsy on 8/23/19 which showed ER/KS +ve (100%), HER 2 negative well differentiated invasive ductal carcinoma. \par \par Pt then underwent B/L breast MRI on 9/10/19 which showed the biopsy-proven carcinoma in the right breast as well as non-mass enhancement in the lateral right breast. MRI guided biopsy was recommended. No MR evidence of malignancy in the left breast was seen. Additionally, indeterminate hepatic mass was seen for which a dedicated MRI with and without contrast was recommended. Pt also had a b/l axillary US which was negative.\par \par MRI guided biopsy of non mass enhancement showed:\par  - Benign atrophic fatty breast tissue with proliferative type fibrocystic \par changes including dominant nodular foci of cystic/papillary apocrine \par metaplasia, usual type duct hyperplasia with focal squamoid metaplasia, \par sclerosing adenosis, stromal fibrosis and elastosis, and \par microcalcifications.\par - Focal mammary duct ectasia and microscopic foci of fat necrosis.\par \par MRI abdomen showed:\par Multiple peripherally enhancing lesions in both lobes of the liver (at least 5), compatible with metastases. Reference lesions:\par 3.0 x 2.3 cm right hepatic dome lesion \par 1.9 x 2.0 cm segment 4 lesion \par 0.9 x 0.8 cm segment 2 lesion \par Beaded dilatation of the pancreatic duct up to at least 7 mm. \par Numerous cystic lesions are present throughout the pancreas, difficult to separate from the dilated pancreatic duct the largest measuring 2.5 x 1.9 cm at the pancreatic neck. This lesion exerts mass effect upon and narrows the main portal vein. No definite enhancing components are present.\par \par Pt presents today to discuss further evaluation and management of the new hepati and pancreatic lesions along with new right breast Ca. She has no symptoms. Denies having any breast lumps. No pain, abdominal discomfort, jaundice, N/V/D. She does c/o loss of appetite and weight loss. She attributes it to her depression. She does have h/o using hormone replacement therapy for 20 years which she recently discontinued. \par \par \par \par

## 2019-10-02 NOTE — CONSULT LETTER
[Dear  ___] : Dear  [unfilled], [Consult Letter:] : I had the pleasure of evaluating your patient, [unfilled]. [Please see my note below.] : Please see my note below. [Consult Closing:] : Thank you very much for allowing me to participate in the care of this patient.  If you have any questions, please do not hesitate to contact me. [Sincerely,] : Sincerely, [DrNikita  ___] : Dr. BARTON [FreeTextEntry3] : Dr. Cordero

## 2019-10-02 NOTE — ASSESSMENT
[FreeTextEntry1] : 73 y/o F with h/o HTN, DLD, mild HOCM, recent Dx of DM was referred by breast surgeon Dr Curiel for recently diagnosed multiple suspicious hepatic and pancreatic masses while undergoing w/u for the recently diagnosed right breast cancer. \par \par A/P:\par \par #Pancreatic/Liver masses: Rule out metastatic pancreatic cancer ( cholangiocarcinoma , metastatic breast Ca less likely ))\par --Will schedule liver biopsy by IR\par --Check CBC, BMP, hepatic function panel, CEA, CA 19-9, AFP, , CA 15-3, CA 27-29\par \par #Right Breast Ca: ER/OK +ve, HER 2 -ve well differentiated IDC\par --Surgery on hold d/t abovementioned findings\par \par #HCP: Never had colonoscopy\par

## 2019-10-02 NOTE — REVIEW OF SYSTEMS
[Recent Change In Weight] : ~T recent weight change [SOB on Exertion] : shortness of breath during exertion [Depression] : depression [Negative] : Allergic/Immunologic [Shortness Of Breath] : no shortness of breath [Wheezing] : no wheezing [Cough] : no cough [Suicidal] : not suicidal [Insomnia] : no insomnia [Anxiety] : no anxiety

## 2019-10-02 NOTE — REASON FOR VISIT
[Initial Consultation] : an initial consultation [Family Member] : family member [FreeTextEntry2] : Breast cancer, liver and pancreatic masses

## 2019-10-16 ENCOUNTER — APPOINTMENT (OUTPATIENT)
Dept: BREAST CENTER | Facility: CLINIC | Age: 74
End: 2019-10-16

## 2019-10-16 DIAGNOSIS — R16.0 HEPATOMEGALY, NOT ELSEWHERE CLASSIFIED: ICD-10-CM

## 2019-10-16 DIAGNOSIS — K86.89 OTHER SPECIFIED DISEASES OF PANCREAS: ICD-10-CM

## 2019-10-16 DIAGNOSIS — C50.111 MALIGNANT NEOPLASM OF CENTRAL PORTION OF RIGHT FEMALE BREAST: ICD-10-CM

## 2019-11-04 ENCOUNTER — APPOINTMENT (OUTPATIENT)
Dept: CARDIOLOGY | Facility: CLINIC | Age: 74
End: 2019-11-04

## 2019-11-11 ENCOUNTER — APPOINTMENT (OUTPATIENT)
Dept: CARDIOLOGY | Facility: CLINIC | Age: 74
End: 2019-11-11

## 2023-10-01 PROBLEM — K86.89 PANCREATIC MASS: Status: ACTIVE | Noted: 2019-10-01
